# Patient Record
Sex: MALE | Race: WHITE | NOT HISPANIC OR LATINO
[De-identification: names, ages, dates, MRNs, and addresses within clinical notes are randomized per-mention and may not be internally consistent; named-entity substitution may affect disease eponyms.]

---

## 2018-11-19 PROBLEM — Z00.00 ENCOUNTER FOR PREVENTIVE HEALTH EXAMINATION: Status: ACTIVE | Noted: 2018-11-19

## 2018-11-27 ENCOUNTER — APPOINTMENT (OUTPATIENT)
Dept: ORTHOPEDIC SURGERY | Facility: CLINIC | Age: 68
End: 2018-11-27
Payer: MEDICARE

## 2018-11-27 VITALS — WEIGHT: 173 LBS | HEIGHT: 70 IN | BODY MASS INDEX: 24.77 KG/M2 | RESPIRATION RATE: 16 BRPM

## 2018-11-27 DIAGNOSIS — Z78.9 OTHER SPECIFIED HEALTH STATUS: ICD-10-CM

## 2018-11-27 DIAGNOSIS — I10 ESSENTIAL (PRIMARY) HYPERTENSION: ICD-10-CM

## 2018-11-27 DIAGNOSIS — H91.90 UNSPECIFIED HEARING LOSS, UNSPECIFIED EAR: ICD-10-CM

## 2018-11-27 PROCEDURE — 99203 OFFICE O/P NEW LOW 30 MIN: CPT

## 2018-11-27 PROCEDURE — 73140 X-RAY EXAM OF FINGER(S): CPT | Mod: F8

## 2019-01-07 ENCOUNTER — APPOINTMENT (OUTPATIENT)
Dept: ORTHOPEDIC SURGERY | Facility: CLINIC | Age: 69
End: 2019-01-07
Payer: MEDICARE

## 2019-01-07 VITALS — WEIGHT: 173 LBS | RESPIRATION RATE: 16 BRPM | BODY MASS INDEX: 24.77 KG/M2 | HEIGHT: 70 IN

## 2019-01-07 PROCEDURE — 99214 OFFICE O/P EST MOD 30 MIN: CPT

## 2019-01-07 NOTE — HISTORY OF PRESENT ILLNESS
[Right] : right hand dominant [FreeTextEntry1] : Pt here for f/u for a right IV soft tissue mallet that has been splinted for 6 weeks.  He has been compliant with the splint.

## 2019-01-07 NOTE — PHYSICAL EXAM
[de-identified] : Physical exam shows the patient to be alert and oriented x3, capable of ambulation. The patient is well-developed and well-nourished in no apparent respiratory distress. Majority of the skin is intact bilaterally in the upper extremities without lymphadenopathy at the elbows.\par \par There is minimal swelling and no tenderness over the extensor mechanism with full extension of the DIP achieved without support.\par \par Range of motion is 0/90 PIP 0/PIP 0/30 the DIP joint with active and passive range of motion. There is no evidence of joint or tendon stability.

## 2019-01-07 NOTE — ASSESSMENT
[FreeTextEntry1] : A weaning process was discussed in the office in the written copy provided to the patient.\par \par He will follow this reaming process and return to office in 6-8 weeks if there are any issues or concerns otherwise the patient.

## 2019-04-15 ENCOUNTER — APPOINTMENT (OUTPATIENT)
Dept: ORTHOPEDIC SURGERY | Facility: CLINIC | Age: 69
End: 2019-04-15
Payer: MEDICARE

## 2019-04-15 VITALS — RESPIRATION RATE: 16 BRPM | BODY MASS INDEX: 24.77 KG/M2 | HEIGHT: 70 IN | WEIGHT: 173 LBS

## 2019-04-15 DIAGNOSIS — M20.011 MALLET FINGER OF RIGHT FINGER(S): ICD-10-CM

## 2019-04-15 PROCEDURE — 99214 OFFICE O/P EST MOD 30 MIN: CPT

## 2019-04-15 PROCEDURE — 73140 X-RAY EXAM OF FINGER(S): CPT | Mod: F8

## 2023-05-01 ENCOUNTER — NON-APPOINTMENT (OUTPATIENT)
Age: 73
End: 2023-05-01

## 2023-05-12 ENCOUNTER — NON-APPOINTMENT (OUTPATIENT)
Age: 73
End: 2023-05-12

## 2023-05-15 ENCOUNTER — NON-APPOINTMENT (OUTPATIENT)
Age: 73
End: 2023-05-15

## 2023-05-16 ENCOUNTER — NON-APPOINTMENT (OUTPATIENT)
Age: 73
End: 2023-05-16

## 2023-05-16 ENCOUNTER — APPOINTMENT (OUTPATIENT)
Dept: OPHTHALMOLOGY | Facility: CLINIC | Age: 73
End: 2023-05-16
Payer: MEDICARE

## 2023-05-16 PROCEDURE — 92004 COMPRE OPH EXAM NEW PT 1/>: CPT

## 2023-05-16 PROCEDURE — 92136 OPHTHALMIC BIOMETRY: CPT

## 2023-05-16 PROCEDURE — 76511 OPH US DX QUAN A-SCAN ONLY: CPT

## 2023-05-30 ENCOUNTER — TRANSCRIPTION ENCOUNTER (OUTPATIENT)
Age: 73
End: 2023-05-30

## 2023-07-07 ENCOUNTER — NON-APPOINTMENT (OUTPATIENT)
Age: 73
End: 2023-07-07

## 2023-07-07 ENCOUNTER — APPOINTMENT (OUTPATIENT)
Dept: INTERNAL MEDICINE | Facility: CLINIC | Age: 73
End: 2023-07-07
Payer: MEDICARE

## 2023-07-07 VITALS
HEART RATE: 71 BPM | HEIGHT: 70 IN | BODY MASS INDEX: 23.19 KG/M2 | OXYGEN SATURATION: 99 % | TEMPERATURE: 97.5 F | WEIGHT: 162 LBS

## 2023-07-07 VITALS — DIASTOLIC BLOOD PRESSURE: 74 MMHG | SYSTOLIC BLOOD PRESSURE: 113 MMHG

## 2023-07-07 DIAGNOSIS — Z01.818 ENCOUNTER FOR OTHER PREPROCEDURAL EXAMINATION: ICD-10-CM

## 2023-07-07 DIAGNOSIS — G25.0 ESSENTIAL TREMOR: ICD-10-CM

## 2023-07-07 DIAGNOSIS — M48.02 SPINAL STENOSIS, CERVICAL REGION: ICD-10-CM

## 2023-07-07 DIAGNOSIS — E78.5 HYPERLIPIDEMIA, UNSPECIFIED: ICD-10-CM

## 2023-07-07 DIAGNOSIS — M48.061 SPINAL STENOSIS, LUMBAR REGION WITHOUT NEUROGENIC CLAUDICATION: ICD-10-CM

## 2023-07-07 PROCEDURE — 99203 OFFICE O/P NEW LOW 30 MIN: CPT | Mod: 25

## 2023-07-07 PROCEDURE — 36415 COLL VENOUS BLD VENIPUNCTURE: CPT

## 2023-07-07 RX ORDER — ROSUVASTATIN CALCIUM 10 MG/1
10 TABLET, FILM COATED ORAL
Refills: 0 | Status: ACTIVE | COMMUNITY

## 2023-07-07 RX ORDER — OLMESARTAN MEDOXOMIL 5 MG/1
5 TABLET, FILM COATED ORAL DAILY
Refills: 0 | Status: ACTIVE | COMMUNITY

## 2023-07-07 RX ORDER — PROPRANOLOL HYDROCHLORIDE 20 MG/1
20 TABLET ORAL
Refills: 0 | Status: ACTIVE | COMMUNITY

## 2023-07-07 RX ORDER — ASPIRIN 81 MG
81 TABLET, DELAYED RELEASE (ENTERIC COATED) ORAL DAILY
Refills: 0 | Status: ACTIVE | COMMUNITY

## 2023-07-07 NOTE — PHYSICAL EXAM
[No Acute Distress] : no acute distress [Well Nourished] : well nourished [Well Developed] : well developed [Well-Appearing] : well-appearing [Normal] : no acute distress, well nourished, well developed and well-appearing [Normal Sclera/Conjunctiva] : normal sclera/conjunctiva [Normal Outer Ear/Nose] : the outer ears and nose were normal in appearance [No Respiratory Distress] : no respiratory distress  [No Accessory Muscle Use] : no accessory muscle use [Clear to Auscultation] : lungs were clear to auscultation bilaterally [Normal Rate] : normal rate  [Regular Rhythm] : with a regular rhythm [Normal S1, S2] : normal S1 and S2 [No Murmur] : no murmur heard [No Edema] : there was no peripheral edema [Soft] : abdomen soft [Non Tender] : non-tender [Non-distended] : non-distended [No Masses] : no abdominal mass palpated [Normal Bowel Sounds] : normal bowel sounds [No Rash] : no rash [Coordination Grossly Intact] : coordination grossly intact [No Focal Deficits] : no focal deficits [Normal Gait] : normal gait [Deep Tendon Reflexes (DTR)] : deep tendon reflexes were 2+ and symmetric [Normal Affect] : the affect was normal [Normal Insight/Judgement] : insight and judgment were intact [de-identified] : +R cochlear implant

## 2023-07-07 NOTE — HISTORY OF PRESENT ILLNESS
[No Pertinent Cardiac History] : no history of aortic stenosis, atrial fibrillation, coronary artery disease, recent myocardial infarction, or implantable device/pacemaker [No Pertinent Pulmonary History] : no history of asthma, COPD, sleep apnea, or smoking [No Adverse Anesthesia Reaction] : no adverse anesthesia reaction in self or family member [(Patient denies any chest pain, claudication, dyspnea on exertion, orthopnea, palpitations or syncope)] : Patient denies any chest pain, claudication, dyspnea on exertion, orthopnea, palpitations or syncope [Moderate (4-6 METs)] : Moderate (4-6 METs) [Chronic Anticoagulation] : no chronic anticoagulation [Chronic Kidney Disease] : no chronic kidney disease [Diabetes] : no diabetes [FreeTextEntry1] : Cataract Surgery [FreeTextEntry2] : 7/17/23 [FreeTextEntry3] : Dr. Coyle [FreeTextEntry4] : Mr. DARRON MURPHY is a 73 year old male with history of HTN, HLD, and spinal stenosis s/p cervical and lumbar decompression presenting today to the [facility] prior to eye surgery. He reports good overall health and follows regularly with his PMD and cardiologist. Adherent with his medications.  [FreeTextEntry8] : walking 4-6 miles daily on recent trip to [place]

## 2023-07-07 NOTE — ASSESSMENT
[High Risk Surgery - Intraperitoneal, Intrathoracic or Supringuinal Vascular Procedures] : High Risk Surgery - Intraperitoneal, Intrathoracic or Supringuinal Vascular Procedures - No (0) [Ischemic Heart Disease] : Ischemic Heart Disease - No (0) [Congestive Heart Failure] : Congestive Heart Failure - No (0) [Prior Cerebrovascular Accident or TIA] : Prior Cerebrovascular Accident or TIA - No (0) [Creatinine >= 2mg/dL (1 Point)] : Creatinine >= 2mg/dL - No (0) [Insulin-dependent Diabetic (1 Point)] : Insulin-dependent Diabetic - No (0) [0] : 0 , RCRI Class: I, Risk of Post-Op Cardiac Complications: 3.9%, 95% CI for Risk Estimate: 2.8% - 5.4% [Patient Optimized for Surgery] : Patient optimized for surgery [Continue medications as is] : Continue current medications [FreeTextEntry4] : Mr. DARRON MURPHY is a 73 year old male with history of HTN, spinal stenosis s/p decompression presenting today to the [facility] prior to eye surgery. ECG obtained today without ischemia and he is able to achieve >4 METs. He is considered intermediate risk for very low risk procedure.  [FreeTextEntry7] : d

## 2023-07-14 RX ORDER — TROPICAMIDE 1 %
1 DROPS OPHTHALMIC (EYE)
Refills: 0 | Status: DISCONTINUED | OUTPATIENT
Start: 2023-07-17 | End: 2023-07-17

## 2023-07-14 RX ORDER — SODIUM CHLORIDE 9 MG/ML
1000 INJECTION, SOLUTION INTRAVENOUS
Refills: 0 | Status: DISCONTINUED | OUTPATIENT
Start: 2023-07-17 | End: 2023-07-17

## 2023-07-14 NOTE — ASU PATIENT PROFILE, ADULT - NS PREOP UNDERSTANDS INFO
No solid food/dairy/candy/gum after 10:00pm Sunday, water before 05:00am Monday, patient reminded to come with photo ID/insurance/credit/debit card for co-payment; no jewelries/contact lens/valuables; no smoking/alcohol drinking/recreational drug use on Sunday; escort must have photo ID; address and callback number was given./yes

## 2023-07-14 NOTE — ASU PATIENT PROFILE, ADULT - NSICDXPASTMEDICALHX_GEN_ALL_CORE_FT
PAST MEDICAL HISTORY:  Hearing problem     HTN (hypertension)     Spinal stenosis lumber and cervical     PAST MEDICAL HISTORY:  Hearing problem     HTN (hypertension)     Hyperlipidemia     Spinal stenosis lumber and cervical

## 2023-07-14 NOTE — ASU PATIENT PROFILE, ADULT - VISION (WITH CORRECTIVE LENSES IF THE PATIENT USUALLY WEARS THEM):
contacts lens/Partially impaired: cannot see medication labels or newsprint, but can see obstacles in path, and the surrounding layout; can count fingers at arm's length

## 2023-07-14 NOTE — ASU PATIENT PROFILE, ADULT - NSICDXPASTSURGICALHX_GEN_ALL_CORE_FT
PAST SURGICAL HISTORY:  S/P arthroscopy of right shoulder     S/P spinal fusion lumber & cervica;     PAST SURGICAL HISTORY:  S/P arthroscopy of right shoulder x 3    S/P spinal fusion lumber & cervica;

## 2023-07-17 ENCOUNTER — APPOINTMENT (OUTPATIENT)
Dept: OPHTHALMOLOGY | Facility: AMBULATORY SURGERY CENTER | Age: 73
End: 2023-07-17

## 2023-07-17 ENCOUNTER — NON-APPOINTMENT (OUTPATIENT)
Age: 73
End: 2023-07-17

## 2023-07-17 ENCOUNTER — TRANSCRIPTION ENCOUNTER (OUTPATIENT)
Age: 73
End: 2023-07-17

## 2023-07-17 ENCOUNTER — OUTPATIENT (OUTPATIENT)
Dept: OUTPATIENT SERVICES | Facility: HOSPITAL | Age: 73
LOS: 1 days | Discharge: HOME | End: 2023-07-17
Payer: MEDICARE

## 2023-07-17 VITALS
RESPIRATION RATE: 17 BRPM | OXYGEN SATURATION: 100 % | TEMPERATURE: 97 F | HEART RATE: 50 BPM | SYSTOLIC BLOOD PRESSURE: 135 MMHG | DIASTOLIC BLOOD PRESSURE: 60 MMHG

## 2023-07-17 VITALS
TEMPERATURE: 98 F | SYSTOLIC BLOOD PRESSURE: 129 MMHG | WEIGHT: 165.35 LBS | HEIGHT: 70 IN | OXYGEN SATURATION: 100 % | DIASTOLIC BLOOD PRESSURE: 64 MMHG | HEART RATE: 61 BPM | RESPIRATION RATE: 15 BRPM

## 2023-07-17 DIAGNOSIS — Z98.890 OTHER SPECIFIED POSTPROCEDURAL STATES: Chronic | ICD-10-CM

## 2023-07-17 DIAGNOSIS — Z98.1 ARTHRODESIS STATUS: Chronic | ICD-10-CM

## 2023-07-17 PROCEDURE — 66984 XCAPSL CTRC RMVL W/O ECP: CPT | Mod: LT

## 2023-07-17 DEVICE — LENS IOL TECNIS PROTEC ZCB00 20.5D
Type: IMPLANTABLE DEVICE | Site: LEFT (LEFT EYE) | Status: NON-FUNCTIONAL
Removed: 2023-07-17

## 2023-07-17 RX ORDER — PHENYLEPHRINE HCL 2.5 %
1 DROPS OPHTHALMIC (EYE)
Refills: 0 | Status: COMPLETED | OUTPATIENT
Start: 2023-07-17 | End: 2023-07-17

## 2023-07-17 RX ORDER — KETOROLAC TROMETHAMINE 0.5 %
1 DROPS OPHTHALMIC (EYE)
Refills: 0 | Status: COMPLETED | OUTPATIENT
Start: 2023-07-17 | End: 2023-07-17

## 2023-07-17 RX ORDER — POLYMYXIN B SULF/TRIMETHOPRIM 10000-1/ML
1 DROPS OPHTHALMIC (EYE)
Refills: 0 | Status: COMPLETED | OUTPATIENT
Start: 2023-07-17 | End: 2023-07-17

## 2023-07-17 RX ORDER — ACETAMINOPHEN 500 MG
650 TABLET ORAL ONCE
Refills: 0 | Status: DISCONTINUED | OUTPATIENT
Start: 2023-07-17 | End: 2023-07-17

## 2023-07-17 RX ADMIN — Medication 1 DROP(S): at 07:30

## 2023-07-17 RX ADMIN — Medication 1 DROP(S): at 07:35

## 2023-07-17 RX ADMIN — Medication 1 DROP(S): at 07:40

## 2023-07-17 NOTE — ASU DISCHARGE PLAN (ADULT/PEDIATRIC) - NS MD DC FALL RISK RISK
For information on Fall & Injury Prevention, visit: [email or web address] OR  [email or web address] OR  https://www.cdc.gov/steadi/patient.html

## 2023-07-18 ENCOUNTER — APPOINTMENT (OUTPATIENT)
Dept: OPHTHALMOLOGY | Facility: CLINIC | Age: 73
End: 2023-07-18
Payer: MEDICARE

## 2023-07-18 ENCOUNTER — NON-APPOINTMENT (OUTPATIENT)
Age: 73
End: 2023-07-18

## 2023-07-18 PROCEDURE — 99024 POSTOP FOLLOW-UP VISIT: CPT

## 2023-07-25 ENCOUNTER — NON-APPOINTMENT (OUTPATIENT)
Age: 73
End: 2023-07-25

## 2023-07-25 ENCOUNTER — APPOINTMENT (OUTPATIENT)
Dept: OPHTHALMOLOGY | Facility: CLINIC | Age: 73
End: 2023-07-25
Payer: MEDICARE

## 2023-07-25 PROBLEM — M48.00 SPINAL STENOSIS, SITE UNSPECIFIED: Chronic | Status: ACTIVE | Noted: 2023-07-14

## 2023-07-25 PROBLEM — H91.90 UNSPECIFIED HEARING LOSS, UNSPECIFIED EAR: Chronic | Status: ACTIVE | Noted: 2023-07-14

## 2023-07-25 PROBLEM — I10 ESSENTIAL (PRIMARY) HYPERTENSION: Chronic | Status: ACTIVE | Noted: 2023-07-14

## 2023-07-25 PROBLEM — E78.5 HYPERLIPIDEMIA, UNSPECIFIED: Chronic | Status: ACTIVE | Noted: 2023-07-17

## 2023-07-25 PROCEDURE — 99024 POSTOP FOLLOW-UP VISIT: CPT

## 2023-08-22 ENCOUNTER — APPOINTMENT (OUTPATIENT)
Dept: OPHTHALMOLOGY | Facility: CLINIC | Age: 73
End: 2023-08-22
Payer: MEDICARE

## 2023-08-22 ENCOUNTER — NON-APPOINTMENT (OUTPATIENT)
Age: 73
End: 2023-08-22

## 2023-08-22 PROCEDURE — 99024 POSTOP FOLLOW-UP VISIT: CPT

## 2023-08-22 PROCEDURE — 92136 OPHTHALMIC BIOMETRY: CPT

## 2023-10-20 ENCOUNTER — NON-APPOINTMENT (OUTPATIENT)
Age: 73
End: 2023-10-20

## 2023-10-20 ENCOUNTER — APPOINTMENT (OUTPATIENT)
Dept: INTERNAL MEDICINE | Facility: CLINIC | Age: 73
End: 2023-10-20
Payer: MEDICARE

## 2023-10-20 VITALS
HEIGHT: 70 IN | BODY MASS INDEX: 23.34 KG/M2 | SYSTOLIC BLOOD PRESSURE: 121 MMHG | HEART RATE: 74 BPM | TEMPERATURE: 97.3 F | DIASTOLIC BLOOD PRESSURE: 74 MMHG | WEIGHT: 163 LBS | OXYGEN SATURATION: 96 %

## 2023-10-20 PROCEDURE — 93000 ELECTROCARDIOGRAM COMPLETE: CPT | Mod: 59

## 2023-10-20 PROCEDURE — 99214 OFFICE O/P EST MOD 30 MIN: CPT | Mod: 25

## 2023-10-26 RX ORDER — SODIUM CHLORIDE 9 MG/ML
1000 INJECTION, SOLUTION INTRAVENOUS
Refills: 0 | Status: DISCONTINUED | OUTPATIENT
Start: 2023-10-30 | End: 2023-10-30

## 2023-10-27 NOTE — ASU PATIENT PROFILE, ADULT - NSICDXPASTMEDICALHX_GEN_ALL_CORE_FT
PAST MEDICAL HISTORY:  Hearing problem     HTN (hypertension)     Hyperlipidemia     Spinal stenosis lumber and cervical     PAST MEDICAL HISTORY:  Essential tremor     Hearing problem     HTN (hypertension)     Hyperlipidemia     Spinal stenosis lumber and cervical

## 2023-10-27 NOTE — ASU PATIENT PROFILE, ADULT - NSICDXPASTSURGICALHX_GEN_ALL_CORE_FT
PAST SURGICAL HISTORY:  S/P arthroscopy of right shoulder x 3    S/P cataract extraction left    S/P spinal fusion lumber & cervical

## 2023-10-27 NOTE — ASU PATIENT PROFILE, ADULT - NS PREOP UNDERSTANDS INFO
No solid food/dairy/candy/gum after 9:30pm Sunday; water allowed before 04:30am Monday; patient to come with photo ID/insurance/credit card; no jewelries/contact lens/valuables; dress in comfortable clothes; no smoking/alcohol drinking/recreational drug use today; escort to come with photo ID; address and callback number was given/yes

## 2023-10-30 ENCOUNTER — NON-APPOINTMENT (OUTPATIENT)
Age: 73
End: 2023-10-30

## 2023-10-30 ENCOUNTER — OUTPATIENT (OUTPATIENT)
Dept: OUTPATIENT SERVICES | Facility: HOSPITAL | Age: 73
LOS: 1 days | Discharge: HOME | End: 2023-10-30
Payer: MEDICARE

## 2023-10-30 ENCOUNTER — TRANSCRIPTION ENCOUNTER (OUTPATIENT)
Age: 73
End: 2023-10-30

## 2023-10-30 ENCOUNTER — APPOINTMENT (OUTPATIENT)
Dept: OPHTHALMOLOGY | Facility: AMBULATORY SURGERY CENTER | Age: 73
End: 2023-10-30

## 2023-10-30 VITALS
HEART RATE: 59 BPM | DIASTOLIC BLOOD PRESSURE: 65 MMHG | TEMPERATURE: 98 F | SYSTOLIC BLOOD PRESSURE: 114 MMHG | RESPIRATION RATE: 16 BRPM | OXYGEN SATURATION: 99 % | WEIGHT: 159.84 LBS | HEIGHT: 70 IN

## 2023-10-30 VITALS
TEMPERATURE: 98 F | HEART RATE: 57 BPM | OXYGEN SATURATION: 99 % | SYSTOLIC BLOOD PRESSURE: 123 MMHG | DIASTOLIC BLOOD PRESSURE: 60 MMHG | RESPIRATION RATE: 16 BRPM

## 2023-10-30 DIAGNOSIS — Z98.890 OTHER SPECIFIED POSTPROCEDURAL STATES: Chronic | ICD-10-CM

## 2023-10-30 DIAGNOSIS — Z98.1 ARTHRODESIS STATUS: Chronic | ICD-10-CM

## 2023-10-30 DIAGNOSIS — Z98.49 CATARACT EXTRACTION STATUS, UNSPECIFIED EYE: Chronic | ICD-10-CM

## 2023-10-30 PROCEDURE — 66984 XCAPSL CTRC RMVL W/O ECP: CPT | Mod: RT

## 2023-10-30 DEVICE — LENS IOL TECNIS PROTEC ZCB00 19.5D
Type: IMPLANTABLE DEVICE | Site: RIGHT | Status: NON-FUNCTIONAL
Removed: 2023-10-30

## 2023-10-30 RX ORDER — POLYMYXIN B SULF/TRIMETHOPRIM 10000-1/ML
1 DROPS OPHTHALMIC (EYE)
Refills: 0 | Status: COMPLETED | OUTPATIENT
Start: 2023-10-30 | End: 2023-10-30

## 2023-10-30 RX ORDER — OLMESARTAN MEDOXOMIL 5 MG/1
2 TABLET, FILM COATED ORAL
Refills: 0 | DISCHARGE

## 2023-10-30 RX ORDER — ACETAMINOPHEN 500 MG
650 TABLET ORAL ONCE
Refills: 0 | Status: DISCONTINUED | OUTPATIENT
Start: 2023-10-30 | End: 2023-10-30

## 2023-10-30 RX ORDER — ACETAMINOPHEN 500 MG
2 TABLET ORAL
Qty: 0 | Refills: 0 | DISCHARGE
Start: 2023-10-30

## 2023-10-30 RX ORDER — PHENYLEPHRINE HCL 2.5 %
1 DROPS OPHTHALMIC (EYE)
Refills: 0 | Status: COMPLETED | OUTPATIENT
Start: 2023-10-30 | End: 2023-10-30

## 2023-10-30 RX ORDER — PROPRANOLOL HCL 160 MG
1 CAPSULE, EXTENDED RELEASE 24HR ORAL
Refills: 0 | DISCHARGE

## 2023-10-30 RX ORDER — ROSUVASTATIN CALCIUM 5 MG/1
1 TABLET ORAL
Refills: 0 | DISCHARGE

## 2023-10-30 RX ORDER — KETOROLAC TROMETHAMINE 0.5 %
1 DROPS OPHTHALMIC (EYE)
Refills: 0 | Status: COMPLETED | OUTPATIENT
Start: 2023-10-30 | End: 2023-10-30

## 2023-10-30 RX ORDER — ASPIRIN/CALCIUM CARB/MAGNESIUM 324 MG
1 TABLET ORAL
Refills: 0 | DISCHARGE

## 2023-10-30 RX ORDER — TROPICAMIDE 1 %
1 DROPS OPHTHALMIC (EYE)
Refills: 0 | Status: COMPLETED | OUTPATIENT
Start: 2023-10-30 | End: 2023-10-30

## 2023-10-30 RX ADMIN — Medication 1 DROP(S): at 06:55

## 2023-10-30 RX ADMIN — Medication 1 DROP(S): at 07:02

## 2023-10-30 RX ADMIN — Medication 1 DROP(S): at 07:13

## 2023-10-30 RX ADMIN — Medication 1 DROP(S): at 07:14

## 2023-10-30 NOTE — PRE-ANESTHESIA EVALUATION ADULT - LAST ECHOCARDIOGRAM
This is a historical note converted from [place]. Formatting and pictures may have been removed.  Please reference [place] for original formatting and attached multimedia. Admit and Discharge Dates  Admit Date: 11/06/2021  Discharge Date: 11/09/2021  Physicians  Attending Physician - Shelbi Lerner DO  Admitting Physician - Shelbi Lerner DO  Consulting Physician - Vance SOTELO, Tk HUSSEIN  Consulting Physician - Patricia SOTELO, Dee PATRICIO  Primary Care Physician - Bhumika Orozco  Discharge Diagnosis  Cardiac dysrhythmia?I49.9  Defibrillator discharge?Z45.02  Surgical Procedures  No procedures recorded for this visit.  Immunizations  No immunizations recorded for this visit.  Admission Information  This is a?61-year-old  male with a past medical history of?coronary artery disease s/p high risk PCI?in May 2021,?hyperlipidemia, hypertension,?HFrEF?who presents?because?his cardiac defibrillator?shocked him. ?Patient was at home eating and watching TV, he denies any extraneous activities, and he denies any symptoms during this time (such as chest pain)?when his?defibrillator shocked him. He immediately jumped up from sitting down?and then went right back to sitting down. ?He denied any loss of consciousness, denied hitting his head,?and?denies incontinence.  On interrogation of his device patient had a 1 minute and 6-second run of sustained ventricle tachycardia?with?heart rate of 190 bpm,?electrical shock was then administered?and HR returned to?baseline.? On?review of his records patient has been having?6 to 9 seconds?runs of unsustained?ventricular tachycardia without?shocks for the past 2 months.  Hospital Course  During his stay, patients pacemaker was interrogated which suspected multiple episodes of nonsustained VT and one episode episode of sustained VT at which point ATP x3 was attempted however was unsuccessful and then the patient received an appropriate shock.? Patient continued to have asymptomatic short  runs of VT while on telemetry.? Patient also continues to have some PVCs on EKGs recorded. Patients GDMT was increased to be optimized further while inpatient with an increase to his metoprolol succinate dose as 50 mg daily in the a.m. and 25 mg daily in the p.m.? Patient was also increased in his potassium supplementation to 20 mg twice daily.? Patient was seen by cardiology who agreed with?plan of care. ?Patient will follow up at upcoming cardiology appointment on 10/23/2021 at 10:45 AM and will follow-up at post wards with IM on 10/17/2021.? Patient hopes to establish with [facility] at that time.? Patient would likely benefit from further increase in metoprolol to 50 twice daily, and would likely benefit from an increase in Aldactone however these changes should not be made simultaneously.  ?  During his stay,?patients thyroid function?depicted?that his?previous dose of levothyroxine?was too high.? Patient had increased?T4 and decreased TSH.? Patients letter thyroxine dose was decreased?to 0.038?mg daily?and patient will be discharged with this dose.  Significant Findings  Accession:?[id number]  Order:?XR Chest 1 View  Report Dt/Tm:?11/06/2021 15:42  Report:?  CHEST 1 VIEW (PORTABLE):  ?  HISTORY: Other (please specify)  ?  ?  ?  FINDINGS: Correlation 5/7/2021  Lungs are well aerated. No shift of the mediastinum. Dual-lead  pacemaker present. No pleural effusion or pneumothorax.  ?  IMPRESSION:  No active pulmonary finding  ?  ?  ?  Time Spent on discharge  35 minutes  Objective  Vitals & Measurements  T:?36.8? ?C (Oral)? TMIN:?36.7? ?C (Oral)? TMAX:?37.0? ?C (Oral)? HR:?59(Peripheral)? HR:?68(Monitored)? RR:?18? BP:?125/75? SpO2:?96%?  Physical Exam  General: Patient resting comfortably in bed, in no acute distress?  Eye: PERRLA, EOMI, clear conjunctiva, eyelids normal  HENT: Head-normocephalic and atraumatic, dental prosthetics?in place  Respiratory: clear to auscultation bilaterally without wheezes, rales,  rhonchi  Cardiovascular: regular rate and rhythm without murmurs. ?No gallops or rubs no JVD. ?Capillary refill within normal limits.? No edema,?pulses 2+ in all 4 extremities  Gastrointestinal: soft, non-tender, non-distended with normal bowel sounds, without masses to palpation  Integumentary: no rashes or skin lesions present, scar over top?left superior anterior thorax  Neurologic: no signs of peripheral neurological deficit, motor/sensory function intact  Psychiatric: ?alert and oriented, cognitive function intact, cooperative with exam, good eye contact, judgement and insight intact, mood and affect full range  Patient Discharge Condition  Stable, to home  Discharge Disposition  Mr.Shannon Garcia?is being discharged?home.  ?   Activity:?Return to normal activity. Recommend returning to?workin 3?days.  Diet:?Regular Diet  ?  Medications:  -Rx provided for _ for _ days  -Metoprolol succinate?50 mg in a.m. and 25 mg in p.m.?provided for?GDMT?for?30 days  -Continue other medications as previously prescribed  ?   Follow Ups:  -To be seen in [facility] with [facility]?by Dr. Rosa in 2 weeks  -Referred to cardiology?clinic, appointment set for 10/23/2021 at 10:45 AM  -The following labs are to be drawn at the [facility] visit: BMP  -The following imaging studies are to be ordered at the [facility] visit: EKG  ?  ?   The above information was discussed with?the patient?in clear terms.?Hewasable to repeat the instructions to me in?hisown words. All questions answered. ED precautions provided.?At this time, patient is determined to have maximized benefits of IP hospitalization. Patient is discharged in stable condition with OP f/u recommendations and instructions. All questions answered, and patient verbalized agreement with the POC. The patient was given return precautions prior to d/c including symptoms that should prompt return to ED or to call PCP. Total time spent of DC of 35 minutes.?   Discharge Medication  Reconciliation  Prescribed  levothyroxine (levothyroxine 75 mcg (0.075 mg) oral tablet)?0.038 mg, Oral, Daily  Continue  albuterol (Albuterol (Eqv-ProAir HFA) 90 mcg/inh inhalation aerosol)?See Instructions  bumetanide (bumetanide 2 mg oral tablet)?2 mg, Oral, BID  clopidogrel (clopidogrel 75 mg oral tablet)?75 mg, Oral, Daily  ergocalciferol (Vitamin D)?1 tab, Oral, Daily  metoprolol (metoprolol succinate 25 mg oral tablet, extended release)?See Instructions  potassium chloride (potassium chloride 10 mEq oral tablet, extended release)?20 mEq, Oral, BID  rivaroxaban (Xarelto 20mg Tablet)?20 mg, Oral, Daily  sacubitril-valsartan (Entresto 24 mg-26 mg oral tablet)?See Instructions  spironolactone (spironolactone 25 mg oral tablet)?See Instructions  Discontinue  levothyroxine (levothyroxine 50 mcg (0.05 mg) oral tablet)?50 mcg, Oral, Daily  metoprolol (metoprolol succinate 25 mg oral tablet, extended release)?25 mg, Oral, Daily  metoprolol (metoprolol succinate 25 mg oral tablet, extended release)?50 mg, Oral, Daily  Education and Orders Provided  Discharge - 11/09/21 10:35:00 CST, Home, ok to discharge after Meds to bed and patient has ride home?  Follow up  Report to Emergency Department if symptoms return or worsen  Follow up with [facility] 11/23 at 10:45AM  Follow-up in post wards in 2 weeks, with [facility] with Sam Castillo  F/u with PCP Bhumika Dudley within 1 month  Car Seat Challenge  No Qualifying Data      Noted If In Chart

## 2023-10-30 NOTE — OPERATIVE REPORT - OPERATIVE RPOSRT DETAILS
SURGEON:  RAMIRO CORRALES M.D.    DATE OF SURGERY:  10/30/23    SURGICAL PROCEDURE:  CATARACT REMOVAL WITH LENS IMPLANT    LATERALITY:  RIGHT EYE    COMPLEXITY:  NORMAL    IMPLANT USED:  ZCBOO 19.50 DIOPTERS    COMPLICATIONS:  NONE    ESTIMATED BLOOD LOSS: <1 cc    PROCEDURE:  THE PATIENT WAS BROUGHT TO THE OPERATING ROOM AFTER THE SURGICAL EYE WAS IDENTIFIED AND MARKED IN THE PREOPERATIVE HOLDING AREA.  ONCE IN THE O.R. THE PATIENT AND SURGICAL SITE WERE AGAIN IDENTIFIED BY ALL O.R. PERSONNEL DURING A FULL TIME OUT.   THE AREA ABOUT THE SURGICAL EYE WAS PREPPED AND DRAPED IN THE USUAL STERILE FASHION USING SOLUTIONS OF BETADINE AND ALCOHOL.   TOPICAL ANESTHESIA WAS INDUCED USING TETRACAINE.  A STERILE LID SPECULUM WAS PLACED BENEATH THE LIDS OF THE SURGICAL EYE.  IT WAS REMOVED PRIOR TO THE END OF THE PROCEDURE.  A 1 ML SUBCONJUNCTIVAL INJECTION OF LIDOCAINE 2% WITH EPINEPHRINE WAS ADMINISTERED SUPERIORLY AND A 4 MM AREA OF CONJUNCTIVA WAS OPENED AT THE LIMBUS USING A JOHN SCISSORS.   WET FIELD ELECTROCAUTERY WAS USED TO ACHIEVE HEMOSTASIS.   A SCLERAL INCISION WAS CREATED 1MM POSTERIOR TO THE SURGICAL LIMBUS USING A CRESCENT BLADE.   DISSECTION WAS TAKEN ANTERIORLY INTO CLEAR CORNEA WITHOUT ENTERING THE ANTERIOR CHAMBER.   NASAL AND TEMPORAL PERIPHERAL CORNEAL PARACENTESIS PORTS WERE CREATED WITH A 15 DEGREE SUPER BLADE.  INTRAOCULAR PRESERVATIVE FREE LIDOCAINE WAS INJECTED INTO THE ANTERIOR CHAMBER FOLLOWED BY INTRAOCULAR EPINEPHRINE.  AMVISC PLUS WAS INJECTED INTO THE ANTERIOR CHAMBER AND THE SCLERAL TUNNEL WAS COMPLETED USING A 2.4 MM BENT KERATOME.   A CONTINUOUS TEAR CAPSULORRHEXIS WAS THEN CREATED USING A CYSTITOME.   THIS WAS COMPLETED USING UTRATA FORCEPS.   HYDRODISSECTION WAN THEN PERFORMED.   A DIVIDE AND CONQUOR TECHNIQUE WAS THEN USED WITH THE PHACOEMULSIFICATION HANDPIECE TO REMOVE THE NUCLEUS OF THE CATARACT.   REMAINING CORTICAL FIBERS WERE REMOVED USING AUTOMATED IRRIGATION AND ASPIRATION.  THE CAPSULAR BAG WAS INSPECTED AND FOUND TO BE INTACT.  IT WAS FILLED WITH HEALON.  A POSTERIOR CHAMBER LENS WAS PLACED INTO THE CAPSULAR BAG AND ROTATED INTO POSITION.   IT WAS FOUND TO SPONTANEOUSLY CENTER.    REMAINING HEALON WAS REMOVED THOROUGHLY USING AUTOMATED IRRIGATION AND ASPIRATION.   THE SURGICAL WOUNDS WERE HYDRATED AND A SINGLE 10-0 NYLON STITCH WAS PLACED AT THE MAIN ENTRY SITE.  ALL WOUNDS WERE TESTED AND FOUND TO BE WATER TIGHT.   TOBRADEX EYE DROPS WERE PLACED ON THE CORNEA AND THE LID SPECULUM WAS REMOVED.  ERYTHROMYCIN OINTMENT WAS PLACED BENEATH A STERILE PATCH AND SHIELD.   THE PATIENT LEFT THE OPERATING ROOM IN STABLE CONDITION HAVING TOLERATED THE PROCEDURE WELL.

## 2023-10-31 ENCOUNTER — APPOINTMENT (OUTPATIENT)
Dept: OPHTHALMOLOGY | Facility: CLINIC | Age: 73
End: 2023-10-31
Payer: MEDICARE

## 2023-10-31 ENCOUNTER — NON-APPOINTMENT (OUTPATIENT)
Age: 73
End: 2023-10-31

## 2023-10-31 PROCEDURE — 99024 POSTOP FOLLOW-UP VISIT: CPT

## 2023-11-07 ENCOUNTER — APPOINTMENT (OUTPATIENT)
Dept: OPHTHALMOLOGY | Facility: CLINIC | Age: 73
End: 2023-11-07
Payer: MEDICARE

## 2023-11-07 ENCOUNTER — NON-APPOINTMENT (OUTPATIENT)
Age: 73
End: 2023-11-07

## 2023-11-07 PROCEDURE — 99024 POSTOP FOLLOW-UP VISIT: CPT

## 2023-11-15 ENCOUNTER — APPOINTMENT (OUTPATIENT)
Dept: OPHTHALMOLOGY | Facility: CLINIC | Age: 73
End: 2023-11-15
Payer: MEDICARE

## 2023-11-15 ENCOUNTER — NON-APPOINTMENT (OUTPATIENT)
Age: 73
End: 2023-11-15

## 2023-11-15 PROBLEM — G25.0 ESSENTIAL TREMOR: Chronic | Status: ACTIVE | Noted: 2023-10-30

## 2023-11-15 PROCEDURE — 99024 POSTOP FOLLOW-UP VISIT: CPT

## 2023-12-06 ENCOUNTER — APPOINTMENT (OUTPATIENT)
Dept: OPHTHALMOLOGY | Facility: CLINIC | Age: 73
End: 2023-12-06
Payer: MEDICARE

## 2023-12-06 ENCOUNTER — NON-APPOINTMENT (OUTPATIENT)
Age: 73
End: 2023-12-06

## 2023-12-06 PROCEDURE — 99024 POSTOP FOLLOW-UP VISIT: CPT

## 2024-03-28 NOTE — PACU DISCHARGE NOTE - PAIN:
Add zetia 10 mg daily   Add to atorvastatin     Stress test     Mammogram today   Repeat fasting lab prior to seeing me  in August       
Controlled with current regime

## 2024-12-19 ENCOUNTER — NON-APPOINTMENT (OUTPATIENT)
Age: 74
End: 2024-12-19

## 2025-03-18 ENCOUNTER — APPOINTMENT (OUTPATIENT)
Dept: OPHTHALMOLOGY | Facility: CLINIC | Age: 75
End: 2025-03-18
Payer: MEDICARE

## 2025-03-18 ENCOUNTER — NON-APPOINTMENT (OUTPATIENT)
Age: 75
End: 2025-03-18

## 2025-03-18 PROCEDURE — 92014 COMPRE OPH EXAM EST PT 1/>: CPT

## 2025-06-05 NOTE — PRE-ANESTHESIA EVALUATION ADULT - NSANTHOBSERVEDRD_ENT_A_CORE
Called and spoke with patient.  Relayed message about provider not available today.  Rescheduled virtual appointment with Dr. Velásquez on 6/6/25 at 4 pm.    No

## (undated) DEVICE — ELCTR BIPOLAR CORD J&J 12FT DISP

## (undated) DEVICE — PACK CENTURION 2.4MM

## (undated) DEVICE — KIT CENTURION ANTERIOR

## (undated) DEVICE — DRAPE MICROSCOPE KNOB COVER SMALL (2 PCS)

## (undated) DEVICE — SUT NYLON 10-0 12" CU-5

## (undated) DEVICE — TIP OZIL 12 DEGREE MINI FLARE

## (undated) DEVICE — GLV 7.5 PROTEXIS (WHITE)

## (undated) DEVICE — PREP BETADINE 5% STERILE OPTHALMIC SOLUTION

## (undated) DEVICE — KNIFE ALCON SLIT INTREPID CLEAR-CUT SAFETY 2.4MM

## (undated) DEVICE — Device

## (undated) DEVICE — ELCTR ERASER BI-P BVL 45DEG 18G

## (undated) DEVICE — KNIFE ALCON STANDARD FULL HANDLE 15 DEG (PINK)

## (undated) DEVICE — DRSG STERISTRIPS 0.5 X 4"

## (undated) DEVICE — NDL HYPO SAFE 27G X 5/8" (GRAY)

## (undated) DEVICE — TRANSFORMER INTREPID I/A 0.3MM

## (undated) DEVICE — KNIFE ALCON CRESCENT ANGLED BEVEL UP 2.3MM (PINK)

## (undated) DEVICE — PACK ANTERIOR SEGMENT